# Patient Record
Sex: FEMALE | Race: WHITE | NOT HISPANIC OR LATINO | URBAN - METROPOLITAN AREA
[De-identification: names, ages, dates, MRNs, and addresses within clinical notes are randomized per-mention and may not be internally consistent; named-entity substitution may affect disease eponyms.]

---

## 2017-01-04 ENCOUNTER — HOSPITAL ENCOUNTER (OUTPATIENT)
Dept: URGENT CARE | Facility: CLINIC | Age: 32
Setting detail: SPECIMEN
Discharge: HOME | End: 2017-01-04
Attending: FAMILY MEDICINE | Admitting: FAMILY MEDICINE

## 2017-01-04 LAB
BACTERIA SPEC AEROBE CULT: NORMAL
Lab: NORMAL
MICRO REPORT STATUS: NORMAL
SPECIMEN SOURCE: NORMAL

## 2023-08-10 ENCOUNTER — APPOINTMENT (OUTPATIENT)
Dept: GENERAL RADIOLOGY | Facility: HOSPITAL | Age: 38
End: 2023-08-10
Payer: COMMERCIAL

## 2023-08-10 ENCOUNTER — HOSPITAL ENCOUNTER (EMERGENCY)
Facility: HOSPITAL | Age: 38
Discharge: HOME | End: 2023-08-10
Attending: EMERGENCY MEDICINE
Payer: COMMERCIAL

## 2023-08-10 VITALS
SYSTOLIC BLOOD PRESSURE: 137 MMHG | OXYGEN SATURATION: 100 % | HEIGHT: 62 IN | RESPIRATION RATE: 17 BRPM | TEMPERATURE: 98.1 F | BODY MASS INDEX: 19.32 KG/M2 | HEART RATE: 62 BPM | DIASTOLIC BLOOD PRESSURE: 74 MMHG | WEIGHT: 105 LBS
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
## 2023-08-10 DIAGNOSIS — M25.511 RIGHT SHOULDER PAIN, UNSPECIFIED CHRONICITY: ICD-10-CM

## 2023-08-10 DIAGNOSIS — S43.014A ANTERIOR DISLOCATION OF RIGHT SHOULDER, INITIAL ENCOUNTER: Primary | ICD-10-CM

## 2023-08-10 PROCEDURE — 25010000002 ONDANSETRON PER 1 MG: Performed by: PHYSICIAN ASSISTANT

## 2023-08-10 PROCEDURE — 25010000002 MIDAZOLAM PER 1 MG: Performed by: PHYSICIAN ASSISTANT

## 2023-08-10 PROCEDURE — 99152 MOD SED SAME PHYS/QHP 5/>YRS: CPT

## 2023-08-10 PROCEDURE — 99283 EMERGENCY DEPT VISIT LOW MDM: CPT

## 2023-08-10 PROCEDURE — 73030 X-RAY EXAM OF SHOULDER: CPT

## 2023-08-10 PROCEDURE — 96375 TX/PRO/DX INJ NEW DRUG ADDON: CPT

## 2023-08-10 PROCEDURE — 96374 THER/PROPH/DIAG INJ IV PUSH: CPT

## 2023-08-10 PROCEDURE — 25010000002 MORPHINE PER 10 MG: Performed by: PHYSICIAN ASSISTANT

## 2023-08-10 RX ORDER — ETOMIDATE 2 MG/ML
10 INJECTION INTRAVENOUS ONCE
Status: DISCONTINUED | OUTPATIENT
Start: 2023-08-10 | End: 2023-08-10 | Stop reason: HOSPADM

## 2023-08-10 RX ORDER — ETOMIDATE 2 MG/ML
INJECTION INTRAVENOUS
Status: COMPLETED | OUTPATIENT
Start: 2023-08-10 | End: 2023-08-10

## 2023-08-10 RX ORDER — HYDROCODONE BITARTRATE AND ACETAMINOPHEN 5; 325 MG/1; MG/1
1 TABLET ORAL EVERY 6 HOURS PRN
Qty: 12 TABLET | Refills: 0 | Status: SHIPPED | OUTPATIENT
Start: 2023-08-10

## 2023-08-10 RX ORDER — ONDANSETRON 2 MG/ML
4 INJECTION INTRAMUSCULAR; INTRAVENOUS ONCE
Status: COMPLETED | OUTPATIENT
Start: 2023-08-10 | End: 2023-08-10

## 2023-08-10 RX ORDER — MIDAZOLAM HYDROCHLORIDE 1 MG/ML
2 INJECTION INTRAMUSCULAR; INTRAVENOUS ONCE
Status: COMPLETED | OUTPATIENT
Start: 2023-08-10 | End: 2023-08-10

## 2023-08-10 RX ADMIN — ONDANSETRON 4 MG: 2 INJECTION INTRAMUSCULAR; INTRAVENOUS at 10:33

## 2023-08-10 RX ADMIN — SODIUM CHLORIDE 1000 ML: 9 INJECTION, SOLUTION INTRAVENOUS at 10:32

## 2023-08-10 RX ADMIN — MIDAZOLAM 2 MG: 1 INJECTION INTRAMUSCULAR; INTRAVENOUS at 11:03

## 2023-08-10 RX ADMIN — MORPHINE SULFATE 4 MG: 4 INJECTION, SOLUTION INTRAMUSCULAR; INTRAVENOUS at 10:32

## 2023-08-10 RX ADMIN — ETOMIDATE 2 MG: 2 INJECTION INTRAVENOUS at 11:08

## 2023-08-10 RX ADMIN — ETOMIDATE 5 MG: 2 INJECTION INTRAVENOUS at 11:06

## 2023-08-10 RX ADMIN — ETOMIDATE 3 MG: 2 INJECTION INTRAVENOUS at 11:07

## 2023-08-10 NOTE — DISCHARGE INSTRUCTIONS
Take Norco as needed for pain.    Wear shoulder sling for the next 5 to 6 days, strongly recommend following up with orthopedic specialist prior to removing your sling.    Follow-up with primary care for recheck  Return to the ER for new or worsening symptoms

## 2023-08-10 NOTE — ED PROVIDER NOTES
Subjective   History of Present Illness  Chief Complaint: Shoulder pain    Patient is a 38-year-old  female no significant past medical history presents to the ER with complaints of severe right shoulder pain today.  She states that she was running outside when she tripped and fell try to catch herself by stretching her arms and thinks that she may have dislocated her shoulder.  She states that this happened about 18 years ago but she has had no issues since.  She denies hitting her head or LOC.  She denies any chest pain shortness of breath.  She has no other complaints.    PCP: None    History provided by:  Patient    Review of Systems   Constitutional:  Negative for chills and fever.   HENT:  Negative for sore throat and trouble swallowing.    Eyes: Negative.    Respiratory:  Negative for shortness of breath and wheezing.    Cardiovascular:  Negative for chest pain.   Gastrointestinal:  Negative for abdominal pain, diarrhea, nausea and vomiting.   Endocrine: Negative.    Genitourinary:  Negative for dysuria.   Musculoskeletal:  Positive for arthralgias.        Right arm pain, right shoulder pain   Skin:  Negative for rash.   Allergic/Immunologic: Negative.    Neurological:  Negative for light-headedness and headaches.   Psychiatric/Behavioral:  Negative for behavioral problems.    All other systems reviewed and are negative.    History reviewed. No pertinent past medical history.    No Known Allergies    History reviewed. No pertinent surgical history.    History reviewed. No pertinent family history.    Social History     Socioeconomic History    Marital status:            Objective   Physical Exam  Vitals and nursing note reviewed.   Constitutional:       Appearance: Normal appearance. She is well-developed and normal weight. She is not ill-appearing or toxic-appearing.   HENT:      Head: Normocephalic and atraumatic.   Eyes:      Pupils: Pupils are equal, round, and reactive to light.  "  Cardiovascular:      Rate and Rhythm: Normal rate and regular rhythm.      Pulses: Normal pulses.      Heart sounds: Normal heart sounds. No murmur heard.  Pulmonary:      Effort: Pulmonary effort is normal. No respiratory distress.      Breath sounds: Normal breath sounds. No wheezing.   Abdominal:      General: Bowel sounds are normal. There is no distension.      Palpations: Abdomen is soft.      Tenderness: There is no abdominal tenderness.   Musculoskeletal:         General: Tenderness and deformity present. Normal range of motion.      Cervical back: Normal range of motion.      Right lower leg: No edema.      Left lower leg: No edema.      Comments: Deformity of the right shoulder, distal pulses present 2+   Skin:     General: Skin is warm and dry.      Capillary Refill: Capillary refill takes less than 2 seconds.      Findings: No rash.   Neurological:      Mental Status: She is alert and oriented to person, place, and time. Mental status is at baseline.   Psychiatric:         Mood and Affect: Mood normal.         Behavior: Behavior normal.       Upper Extremity Dislocation    Date/Time: 8/10/2023 9:26 PM  Performed by: Steve Abbott MD  Authorized by: Steve Abbott MD   Consent: Verbal consent obtained. Written consent not obtained.  Risks and benefits: risks, benefits and alternatives were discussed  Consent given by: patient  Patient understanding: patient states understanding of the procedure being performed  Patient consent: the patient's understanding of the procedure matches consent given  Imaging studies: imaging studies available  Patient identity confirmed: verbally with patient  Time out: Immediately prior to procedure a \"time out\" was called to verify the correct patient, procedure, equipment, support staff and site/side marked as required.  Injury location: shoulder  Location details: right shoulder  Injury type: dislocation  Dislocation type: anterior  Hill-Sachs deformity: " "no  Pre-procedure neurovascular assessment: neurovascularly intact  Pre-procedure distal perfusion: normal  Pre-procedure neurological function: normal  Pre-procedure range of motion: reduced    Anesthesia:  Local anesthesia used: no    Sedation:  Patient sedated: yes  Sedation type: moderate (conscious) sedation  Sedatives: etomidate, midazolam and morphine  Vitals: Vital signs were monitored during sedation.    Manipulation performed: yes  Reduction method: external rotation and traction and counter traction  Reduction successful: yes  X-ray confirmed reduction: yes  Immobilization: sling  Post-procedure distal perfusion: normal  Post-procedure neurological function: normal  Post-procedure range of motion: improved  Patient tolerance: patient tolerated the procedure well with no immediate complications               ED Course  ED Course as of 08/10/23 2130   Thu Aug 10, 2023   1114 Dr. Abbott at bedside for conscious sedation []   1145 Patient reports feeling much better.  Will monitor until she returns to baseline post conscious sedation []      ED Course User Index  [] Ca Medina PA    /74   Pulse 62   Temp 98.1 øF (36.7 øC) (Oral)   Resp 17   Ht 157.5 cm (62\")   Wt 47.6 kg (105 lb)   LMP 07/31/2023 (Approximate)   SpO2 100%   BMI 19.20 kg/mý   Labs Reviewed - No data to display  Medications   sodium chloride 0.9 % bolus 1,000 mL (0 mL Intravenous Stopped 8/10/23 1210)   morphine injection 4 mg (4 mg Intravenous Given 8/10/23 1032)   ondansetron (ZOFRAN) injection 4 mg (4 mg Intravenous Given 8/10/23 1033)   midazolam (VERSED) injection 2 mg (2 mg Intravenous Given 8/10/23 1103)   etomidate (AMIDATE) injection (2 mg Intravenous Given 8/10/23 1108)     XR Shoulder 2+ View Right    Result Date: 8/10/2023  Impression: Reduction of previously seen anterior shoulder dislocation. Electronically Signed: Drea Guan MD  8/10/2023 11:26 AM EDT  Workstation ID: [id number]    XR Shoulder 2+ View " Right    Result Date: 8/10/2023  Impression: Anterior dislocation of the glenohumeral joint Electronically Signed: Grant Leblanc  8/10/2023 11:01 AM EDT  Workstation ID: [id number]                                          Medical Decision Making  Differential Dx (Includes but not limited to): Fracture patient dislocation  Medical Records Reviewed: No pertinent records to review  Labs: N/A  Imaging:, Interpretation, initial x-ray shows anterior dislocation, repeat x-ray shows improvement  Telemetry: N/A  Testing considered but not ordered: CT head patient denies headache or head injury  Nature of Complaint: Acute  Admission vs Discharge: Discharge  Discussion: While in the ED IV was placed appropriate PPE was worn during exam and throughout all encounters with the patient.  Patient had the above evaluation.  Patient initially given morphine and Zofran for pain and nausea.  She was given additional Versed for persistent pain.  Initial x-ray confirmed dislocation, no evidence of fracture.  Patient was consciously sedated with etomidate.  Dr. Abbott at bedside throughout conscious sedation and for procedure.  Patient's shoulder was reduced, postop films confirmed appropriate positioning.  Patient was neurovascular intact distally post reduction and after sling placement.  Patient reports feeling much better.  She will be discharged with short course pain medication and follow-up with PCP and Ortho for recheck.    Discharge plan and instructions were discussed with the patient who verbalized understanding and is in agreement with the plan, all questions were answered at this time.  Patient is aware of signs symptoms that would require immediate return to the emergency room.  Patient understands importance of following up with primary care provider for further evaluation and worsening concerns as well as blood pressure recheck in the next 4 weeks.    Patient was discharged in improved stable condition with an upright steady  gait.    Patient is aware that discharge does not mean that nothing is wrong but indicates no emergencies present and they must continue care with follow-up as given below or physician of her choice.    Problems Addressed:  Anterior dislocation of right shoulder, initial encounter: acute illness or injury  Fall, initial encounter: acute illness or injury  Right shoulder pain, unspecified chronicity: acute illness or injury    Amount and/or Complexity of Data Reviewed  Radiology: ordered. Decision-making details documented in ED Course.    Risk  Prescription drug management.        Final diagnoses:   Anterior dislocation of right shoulder, initial encounter   Right shoulder pain, unspecified chronicity   Fall, initial encounter       ED Disposition  ED Disposition       ED Disposition   Discharge    Condition   Stable    Comment   --               PATIENT CONNECTION - [facility] [id number] [phone]  Schedule an appointment as soon as possible for a visit in 2 days  As needed, If symptoms worsen    Sukhjinder Knott II, MD  [address] [place] [address] [place] [address] [phone]    Schedule an appointment as soon as possible for a visit in 2 days  As needed, If symptoms worsen         Medication List        New Prescriptions      HYDROcodone-acetaminophen 5-325 MG per tablet  Commonly known as: NORCO  Take 1 tablet by mouth Every 6 (Six) Hours As Needed for Moderate Pain.               Where to Get Your Medications        These medications were sent to [facility] - [address] [place] [address]      Hours: Mon-Fri 7:00AM-7:00PM Phone: [phone]   HYDROcodone-acetaminophen 5-325 MG per tablet            Ca Medina PA  08/10/23 6719

## 2023-10-09 PROCEDURE — 87086 URINE CULTURE/COLONY COUNT: CPT | Performed by: PHYSICIAN ASSISTANT

## 2025-02-04 ENCOUNTER — HOSPITAL ENCOUNTER (EMERGENCY)
Age: 40
Discharge: HOME | End: 2025-02-04
Attending: EMERGENCY MEDICINE
Payer: COMMERCIAL

## 2025-02-04 ENCOUNTER — APPOINTMENT (OUTPATIENT)
Age: 40
End: 2025-02-04
Payer: COMMERCIAL

## 2025-02-04 VITALS
RESPIRATION RATE: 15 BRPM | WEIGHT: 120.8 LBS | SYSTOLIC BLOOD PRESSURE: 133 MMHG | DIASTOLIC BLOOD PRESSURE: 95 MMHG | TEMPERATURE: 97.7 F | BODY MASS INDEX: 22.23 KG/M2 | HEIGHT: 62 IN | OXYGEN SATURATION: 100 % | HEART RATE: 89 BPM

## 2025-02-04 DIAGNOSIS — S43.014D ANTERIOR DISLOCATION OF RIGHT SHOULDER, SUBSEQUENT ENCOUNTER: Primary | ICD-10-CM

## 2025-02-04 PROCEDURE — 6360000002 HC RX W HCPCS: Performed by: EMERGENCY MEDICINE

## 2025-02-04 PROCEDURE — 73030 X-RAY EXAM OF SHOULDER: CPT

## 2025-02-04 PROCEDURE — 96375 TX/PRO/DX INJ NEW DRUG ADDON: CPT

## 2025-02-04 PROCEDURE — 23650 CLTX SHO DSLC W/MNPJ WO ANES: CPT

## 2025-02-04 PROCEDURE — 99284 EMERGENCY DEPT VISIT MOD MDM: CPT

## 2025-02-04 PROCEDURE — 96374 THER/PROPH/DIAG INJ IV PUSH: CPT

## 2025-02-04 PROCEDURE — 73020 X-RAY EXAM OF SHOULDER: CPT

## 2025-02-04 PROCEDURE — 2580000003 HC RX 258: Performed by: EMERGENCY MEDICINE

## 2025-02-04 RX ORDER — IBUPROFEN 800 MG/1
800 TABLET, FILM COATED ORAL EVERY 8 HOURS PRN
Qty: 15 TABLET | Refills: 3 | Status: SHIPPED | OUTPATIENT
Start: 2025-02-04

## 2025-02-04 RX ORDER — SODIUM CHLORIDE 9 MG/ML
INJECTION, SOLUTION INTRAVENOUS CONTINUOUS
Status: DISCONTINUED | OUTPATIENT
Start: 2025-02-04 | End: 2025-02-04 | Stop reason: HOSPADM

## 2025-02-04 RX ORDER — MIDAZOLAM HYDROCHLORIDE 1 MG/ML
2 INJECTION, SOLUTION INTRAMUSCULAR; INTRAVENOUS ONCE
Status: DISCONTINUED | OUTPATIENT
Start: 2025-02-04 | End: 2025-02-04

## 2025-02-04 RX ORDER — HYDROCODONE BITARTRATE AND ACETAMINOPHEN 5; 325 MG/1; MG/1
1 TABLET ORAL EVERY 6 HOURS PRN
Qty: 12 TABLET | Refills: 0 | Status: SHIPPED | OUTPATIENT
Start: 2025-02-04 | End: 2025-02-07

## 2025-02-04 RX ORDER — ONDANSETRON 2 MG/ML
4 INJECTION INTRAMUSCULAR; INTRAVENOUS ONCE
Status: COMPLETED | OUTPATIENT
Start: 2025-02-04 | End: 2025-02-04

## 2025-02-04 RX ADMIN — SODIUM CHLORIDE: 9 INJECTION, SOLUTION INTRAVENOUS at 11:50

## 2025-02-04 RX ADMIN — HYDROMORPHONE HYDROCHLORIDE 1 MG: 1 INJECTION, SOLUTION INTRAMUSCULAR; INTRAVENOUS; SUBCUTANEOUS at 11:37

## 2025-02-04 RX ADMIN — ONDANSETRON 4 MG: 2 INJECTION, SOLUTION INTRAMUSCULAR; INTRAVENOUS at 11:37

## 2025-02-04 ASSESSMENT — LIFESTYLE VARIABLES
HOW MANY STANDARD DRINKS CONTAINING ALCOHOL DO YOU HAVE ON A TYPICAL DAY: PATIENT DOES NOT DRINK
HOW OFTEN DO YOU HAVE A DRINK CONTAINING ALCOHOL: NEVER

## 2025-02-04 ASSESSMENT — PAIN DESCRIPTION - ONSET: ONSET: SUDDEN

## 2025-02-04 ASSESSMENT — PAIN DESCRIPTION - ORIENTATION: ORIENTATION: RIGHT

## 2025-02-04 ASSESSMENT — PAIN DESCRIPTION - FREQUENCY: FREQUENCY: CONTINUOUS

## 2025-02-04 ASSESSMENT — PAIN SCALES - GENERAL: PAINLEVEL_OUTOF10: 10

## 2025-02-04 ASSESSMENT — PAIN DESCRIPTION - LOCATION: LOCATION: SHOULDER

## 2025-02-04 ASSESSMENT — PAIN DESCRIPTION - PAIN TYPE: TYPE: ACUTE PAIN

## 2025-02-04 ASSESSMENT — PAIN DESCRIPTION - DESCRIPTORS: DESCRIPTORS: ACHING;SORE;SHARP

## 2025-02-04 ASSESSMENT — PAIN - FUNCTIONAL ASSESSMENT
PAIN_FUNCTIONAL_ASSESSMENT: 0-10
PAIN_FUNCTIONAL_ASSESSMENT: INTOLERABLE, UNABLE TO DO ANY ACTIVE OR PASSIVE ACTIVITIES

## 2025-02-04 NOTE — ED PROVIDER NOTES
[facility] EMERGENCY DEPARTMENT     EMERGENCY DEPARTMENT ENCOUNTER            Pt Name: Zuleyma White   MRN: [id number]   Birthdate 1985   Date of evaluation: 2/4/2025   Provider: Cayden Javier MD   PCP: No primary care provider on file.   Note Started: 11:46 AM EST 2/4/25          CHIEF COMPLAINT     Chief Complaint   Patient presents with    Shoulder Injury     Pt was reaching behind her and believes her shoulder dislocated - had shoulder injury last year that caused a dislocation - pt received 50mcg of fentanyl while enrt from EMS             HISTORY OF PRESENT ILLNESS:   History from : Patient and EMS   Limitations to history : None     Zuleyma White is a 39 y.o. female who presents to the emergency department with a right shoulder dislocation.  She has a history of this occurring 2 times previously.  She was reaching back to get something when she felt her shoulder pop out 30 minutes ago.  She called EMS.  They immobilized her shoulder in a sling and swath transporting her to the emergency department for evaluation.  She is complaining of 8/10 pain to the right shoulder with inability to move the shoulder.    Nursing Notes were all reviewed and agreed with, or any disagreements were addressed in the HPI.     REVIEW OF SYSTEMS :    Positives and Pertinent negatives as per HPI.      MEDICAL HISTORY   has no past medical history on file.    No past surgical history on file.   CURRENTMEDICATIONS       Previous Medications    No medications on file      SCREENINGS          Stevenson Coma Scale  Eye Opening: Spontaneous  Best Verbal Response: Oriented                CIWA Assessment  BP: (!) 137/97  Pulse: 89                  PHYSICAL EXAM :  ED Triage Vitals [02/04/25 1129]   BP Systolic BP Percentile Diastolic BP Percentile Temp Temp Source Pulse Respirations SpO2   (!) 132/102 -- -- 97.7 °F (36.5 °C) Oral 84 16 100 %      Height Weight - Scale         1.575 m (5' 2\") 54.8 kg (120 lb 12.8 oz)

## 2025-02-04 NOTE — ED NOTES
Pt instructed on narcotic/sedation safety, instructed not to drive, operate heavy machinery or drink alcohol while taking narcotic or sedating-type medications.  Pt Verbalizes understanding of these instructions.     Pt to leave at 1237, pt will have a Lyft ride back to her destination.